# Patient Record
Sex: FEMALE | Race: WHITE | Employment: UNEMPLOYED | URBAN - METROPOLITAN AREA
[De-identification: names, ages, dates, MRNs, and addresses within clinical notes are randomized per-mention and may not be internally consistent; named-entity substitution may affect disease eponyms.]

---

## 2018-05-10 ENCOUNTER — OFFICE VISIT (OUTPATIENT)
Dept: PODIATRY | Age: 78
End: 2018-05-10
Payer: MEDICARE

## 2018-05-10 VITALS
HEIGHT: 62 IN | HEART RATE: 68 BPM | BODY MASS INDEX: 42.33 KG/M2 | SYSTOLIC BLOOD PRESSURE: 140 MMHG | DIASTOLIC BLOOD PRESSURE: 63 MMHG | WEIGHT: 230 LBS

## 2018-05-10 DIAGNOSIS — E11.42 TYPE 2 DIABETES MELLITUS WITH DIABETIC POLYNEUROPATHY, WITHOUT LONG-TERM CURRENT USE OF INSULIN (HCC): ICD-10-CM

## 2018-05-10 DIAGNOSIS — B35.1 ONYCHOMYCOSIS: Primary | ICD-10-CM

## 2018-05-10 PROCEDURE — 4040F PNEUMOC VAC/ADMIN/RCVD: CPT | Performed by: PODIATRIST

## 2018-05-10 PROCEDURE — 11721 DEBRIDE NAIL 6 OR MORE: CPT | Performed by: PODIATRIST

## 2018-05-10 PROCEDURE — G8417 CALC BMI ABV UP PARAM F/U: HCPCS | Performed by: PODIATRIST

## 2018-05-10 PROCEDURE — 1123F ACP DISCUSS/DSCN MKR DOCD: CPT | Performed by: PODIATRIST

## 2018-05-10 PROCEDURE — G8427 DOCREV CUR MEDS BY ELIG CLIN: HCPCS | Performed by: PODIATRIST

## 2018-05-10 PROCEDURE — 1090F PRES/ABSN URINE INCON ASSESS: CPT | Performed by: PODIATRIST

## 2018-05-10 PROCEDURE — 1036F TOBACCO NON-USER: CPT | Performed by: PODIATRIST

## 2018-05-10 PROCEDURE — 99203 OFFICE O/P NEW LOW 30 MIN: CPT | Performed by: PODIATRIST

## 2018-05-10 PROCEDURE — G8400 PT W/DXA NO RESULTS DOC: HCPCS | Performed by: PODIATRIST

## 2018-05-10 RX ORDER — SITAGLIPTIN AND METFORMIN HYDROCHLORIDE 500; 50 MG/1; MG/1
TABLET, FILM COATED ORAL
COMMUNITY
Start: 2018-03-07 | End: 2018-11-29

## 2018-05-10 RX ORDER — APIXABAN 5 MG/1
TABLET, FILM COATED ORAL
COMMUNITY
Start: 2018-03-07 | End: 2018-11-29

## 2018-05-10 RX ORDER — ATORVASTATIN CALCIUM 20 MG/1
20 TABLET, FILM COATED ORAL DAILY
COMMUNITY
Start: 2018-03-07

## 2018-05-10 RX ORDER — MONTELUKAST SODIUM 10 MG/1
10 TABLET ORAL NIGHTLY
COMMUNITY
Start: 2018-03-07

## 2018-05-10 RX ORDER — RIBOFLAVIN (VITAMIN B2) 100 MG
TABLET ORAL
COMMUNITY
Start: 2018-03-16 | End: 2019-08-24

## 2018-05-10 RX ORDER — ONDANSETRON 4 MG/1
4 TABLET, ORALLY DISINTEGRATING ORAL EVERY 6 HOURS PRN
COMMUNITY
Start: 2018-03-07

## 2018-05-10 RX ORDER — PREDNISONE 1 MG/1
5 TABLET ORAL DAILY
COMMUNITY
Start: 2018-03-07

## 2018-05-10 RX ORDER — LOSARTAN POTASSIUM 50 MG/1
TABLET ORAL
COMMUNITY
Start: 2018-03-07 | End: 2018-11-29

## 2018-05-10 RX ORDER — SPIRONOLACTONE 25 MG/1
12.5 TABLET ORAL DAILY
COMMUNITY
Start: 2018-03-07

## 2018-05-10 RX ORDER — POTASSIUM CHLORIDE 600 MG/1
10 TABLET, FILM COATED, EXTENDED RELEASE ORAL DAILY
COMMUNITY
Start: 2018-03-07

## 2018-05-10 RX ORDER — ATENOLOL 25 MG/1
25 TABLET ORAL 2 TIMES DAILY
COMMUNITY
Start: 2018-03-07

## 2018-05-10 RX ORDER — GLIPIZIDE 5 MG/1
5 TABLET, FILM COATED, EXTENDED RELEASE ORAL DAILY
COMMUNITY
Start: 2018-03-07

## 2018-05-10 ASSESSMENT — ENCOUNTER SYMPTOMS
NAUSEA: 0
DIARRHEA: 0
COLOR CHANGE: 0
VOMITING: 0
CONSTIPATION: 0

## 2018-08-16 ENCOUNTER — OFFICE VISIT (OUTPATIENT)
Dept: PODIATRY | Age: 78
End: 2018-08-16
Payer: MEDICARE

## 2018-08-16 VITALS
DIASTOLIC BLOOD PRESSURE: 81 MMHG | SYSTOLIC BLOOD PRESSURE: 174 MMHG | WEIGHT: 230 LBS | BODY MASS INDEX: 42.33 KG/M2 | HEART RATE: 72 BPM | HEIGHT: 62 IN

## 2018-08-16 DIAGNOSIS — B35.1 ONYCHOMYCOSIS: Primary | ICD-10-CM

## 2018-08-16 DIAGNOSIS — E11.42 TYPE 2 DIABETES MELLITUS WITH DIABETIC POLYNEUROPATHY, WITHOUT LONG-TERM CURRENT USE OF INSULIN (HCC): ICD-10-CM

## 2018-08-16 PROCEDURE — 11721 DEBRIDE NAIL 6 OR MORE: CPT | Performed by: PODIATRIST

## 2018-08-16 PROCEDURE — 99999 PR OFFICE/OUTPT VISIT,PROCEDURE ONLY: CPT | Performed by: PODIATRIST

## 2018-08-16 ASSESSMENT — ENCOUNTER SYMPTOMS
COLOR CHANGE: 0
NAUSEA: 0
VOMITING: 0
CONSTIPATION: 0
DIARRHEA: 0

## 2018-08-16 NOTE — PROGRESS NOTES
[facility]  Return Patient    Chief Complaint   Patient presents with    Nail Problem     nail trim/ last seen Dr. Gilda Hamman 6/13/18    Other     PCP: Last visit 06/13/2018 Dr Gilda Hamman       Subjective: Coby Judge comes to clinic for Nail Problem (nail trim/ last seen Dr. Gilda Hamman 6/13/18) and Other (PCP: Last visit 06/13/2018 Dr Gilda Hamman)    she is a diabetic and states that she has had diabetes for over 10 years, has good control. Feet are numb. Pt currently has complaint of thickened, elongated nails that they cannot manage by themselves. Pt's primary care physician is Gilda Hamman, MD   Pt's last blood sugar was 117 . No results found for: LABA1C   Complains of numbness in the feet bilat. History reviewed. No pertinent past medical history. Allergies   Allergen Reactions    Adhesive Tape     Avelox [Moxifloxacin]     Ciprofloxacin Nausea Only    Sulfa Antibiotics Nausea And Vomiting     Current Outpatient Prescriptions on File Prior to Visit   Medication Sig Dispense Refill    Vitamins A & D (VITAMIN A & D) 69743-839 units TABS       VENTOLIN  (90 Base) MCG/ACT inhaler       ELIQUIS 5 MG TABS tablet       atenolol (TENORMIN) 25 MG tablet       atorvastatin (LIPITOR) 20 MG tablet       glipiZIDE (GLUCOTROL XL) 5 MG extended release tablet       losartan (COZAAR) 50 MG tablet       montelukast (SINGULAIR) 10 MG tablet       ondansetron (ZOFRAN-ODT) 4 MG disintegrating tablet       potassium chloride (KLOR-CON) 8 MEQ extended release tablet       predniSONE (DELTASONE) 5 MG tablet       JANUMET  MG per tablet       spironolactone (ALDACTONE) 25 MG tablet        No current facility-administered medications on file prior to visit. Review of Systems   Constitutional: Negative for chills, diaphoresis, fatigue, fever and unexpected weight change. Cardiovascular: Positive for leg swelling.    Gastrointestinal: Negative for constipation, current use of insulin ([facility]       Orders Placed This Encounter   Procedures    HM DIABETES FOOT EXAM    TX DEBRIDEMENT OF NAILS, 6 OR MORE           Q7   []Yes    []No                Q8   [x]Yes    []No                     Q9   []Yes    []No    Plan:   Pt was evaluated and examined. Patient was given personalized discharge instructions. Nails 1-10 were debrided sharply in length and thickness with a nipper and , without incident. Pt will follow up in 3 months or sooner if any problems arise. Diagnosis was discussed with the pt and all of their questions were answered in detail. Proper foot hygiene and care was discussed with the pt. Informed patient on proper diabetic foot care and importance of tight glycemic control. Patient to check feet daily and contact the office with any questions/problems/concerns. Other comorbidity noted and will be managed by PCP. Diabetic foot examination performed this visit. The exam included neurological sensory exam, a 10-g monofilament and pinprick sensation, vibration using a 128-Hz tuning fork, ankle reflexes, visual skin inspection, vascular exam including assessment of pedal pulses, orthopedic exam for deformities, and shoe inspection. Increased risk factors noted on the diabetic foot exam include decreased sensory exam and peripheral neuropathy. Shoegear inspected and found to be appropriate size and wear.     8/16/2018    Electronically signed by Carol Martinez DPM on 8/16/2018 at 3:03 PM

## 2018-11-29 ENCOUNTER — OFFICE VISIT (OUTPATIENT)
Dept: PODIATRY | Age: 78
End: 2018-11-29
Payer: MEDICARE

## 2018-11-29 VITALS — BODY MASS INDEX: 41.59 KG/M2 | HEIGHT: 62 IN | WEIGHT: 226 LBS

## 2018-11-29 DIAGNOSIS — L89.611 PRESSURE INJURY OF RIGHT HEEL, STAGE 1: ICD-10-CM

## 2018-11-29 DIAGNOSIS — S90.821A BLISTER OF RIGHT FOOT, INITIAL ENCOUNTER: ICD-10-CM

## 2018-11-29 DIAGNOSIS — E11.42 TYPE 2 DIABETES MELLITUS WITH DIABETIC POLYNEUROPATHY, WITHOUT LONG-TERM CURRENT USE OF INSULIN (HCC): Primary | ICD-10-CM

## 2018-11-29 PROCEDURE — 1036F TOBACCO NON-USER: CPT | Performed by: PODIATRIST

## 2018-11-29 PROCEDURE — 99213 OFFICE O/P EST LOW 20 MIN: CPT | Performed by: PODIATRIST

## 2018-11-29 PROCEDURE — 1123F ACP DISCUSS/DSCN MKR DOCD: CPT | Performed by: PODIATRIST

## 2018-11-29 PROCEDURE — 1101F PT FALLS ASSESS-DOCD LE1/YR: CPT | Performed by: PODIATRIST

## 2018-11-29 PROCEDURE — G8417 CALC BMI ABV UP PARAM F/U: HCPCS | Performed by: PODIATRIST

## 2018-11-29 PROCEDURE — 1090F PRES/ABSN URINE INCON ASSESS: CPT | Performed by: PODIATRIST

## 2018-11-29 PROCEDURE — 4040F PNEUMOC VAC/ADMIN/RCVD: CPT | Performed by: PODIATRIST

## 2018-11-29 PROCEDURE — G8400 PT W/DXA NO RESULTS DOC: HCPCS | Performed by: PODIATRIST

## 2018-11-29 PROCEDURE — G8484 FLU IMMUNIZE NO ADMIN: HCPCS | Performed by: PODIATRIST

## 2018-11-29 PROCEDURE — G8427 DOCREV CUR MEDS BY ELIG CLIN: HCPCS | Performed by: PODIATRIST

## 2018-11-29 RX ORDER — POTASSIUM CHLORIDE 20 MEQ/1
20 TABLET, EXTENDED RELEASE ORAL DAILY
Refills: 3 | COMMUNITY
Start: 2018-10-25 | End: 2019-08-24

## 2018-11-29 RX ORDER — LOSARTAN POTASSIUM 25 MG/1
TABLET ORAL
Refills: 1 | COMMUNITY
Start: 2018-10-10 | End: 2019-08-24

## 2018-11-29 RX ORDER — PANTOPRAZOLE SODIUM 40 MG/1
40 TABLET, DELAYED RELEASE ORAL DAILY
Refills: 1 | COMMUNITY
Start: 2018-11-16

## 2018-11-29 RX ORDER — BUMETANIDE 1 MG/1
1 TABLET ORAL DAILY
Refills: 1 | COMMUNITY
Start: 2018-10-10

## 2018-11-29 RX ORDER — SITAGLIPTIN 50 MG/1
TABLET, FILM COATED ORAL
Refills: 1 | COMMUNITY
Start: 2018-10-12 | End: 2019-08-24

## 2018-11-29 RX ORDER — WARFARIN SODIUM 4 MG/1
TABLET ORAL DAILY
Refills: 1 | COMMUNITY
Start: 2018-10-10

## 2018-11-29 ASSESSMENT — ENCOUNTER SYMPTOMS
NAUSEA: 0
DIARRHEA: 0
VOMITING: 0
COLOR CHANGE: 0
CONSTIPATION: 0

## 2018-11-29 NOTE — PROGRESS NOTES
[facility]  Return Patient    Chief Complaint   Patient presents with    Follow-Up from [facility] Diabetes     BLOODSUGAR: [address] TO CHECK       Subjective: Kay Walker comes to clinic for Follow-Up from Hospital (RIGHT FOOT) and Diabetes (BLOODSUGAR: [address])    she is a diabetic and states that she has had diabetes for over 10 years, has good control. Feet are numb. She was in the hospital, but when she went home she felt that her right heel was rubbing on the bed, had pain. Went to ER, said she had a blister, gave her keflex. Has one more pill. No pain now, wearing a clean sock, no dressing. Pt's primary care physician is Yesenia Oden MD        No results found for: LABA1C   Complains of numbness in the feet bilat. History reviewed. No pertinent past medical history. Allergies   Allergen Reactions    Adhesive Tape     Avelox [Moxifloxacin]     Ciprofloxacin Nausea Only    Sulfa Antibiotics Nausea And Vomiting     Current Outpatient Prescriptions on File Prior to Visit   Medication Sig Dispense Refill    Vitamins A & D (VITAMIN A & D) 23483-614 units TABS       VENTOLIN  (90 Base) MCG/ACT inhaler       atenolol (TENORMIN) 25 MG tablet       atorvastatin (LIPITOR) 20 MG tablet       glipiZIDE (GLUCOTROL XL) 5 MG extended release tablet       montelukast (SINGULAIR) 10 MG tablet       ondansetron (ZOFRAN-ODT) 4 MG disintegrating tablet       potassium chloride (KLOR-CON) 8 MEQ extended release tablet       predniSONE (DELTASONE) 5 MG tablet       spironolactone (ALDACTONE) 25 MG tablet        No current facility-administered medications on file prior to visit. Review of Systems   Constitutional: Negative for chills, diaphoresis, fatigue, fever and unexpected weight change. Cardiovascular: Positive for leg swelling. Gastrointestinal: Negative for constipation, diarrhea, nausea and vomiting.    Musculoskeletal: Positive

## 2019-01-01 ENCOUNTER — OFFICE VISIT (OUTPATIENT)
Dept: PODIATRY | Age: 79
End: 2019-01-01
Payer: MEDICARE

## 2019-01-01 VITALS — WEIGHT: 174 LBS | BODY MASS INDEX: 30.82 KG/M2

## 2019-01-01 DIAGNOSIS — M21.961 FOOT DEFORMITY, BILATERAL: ICD-10-CM

## 2019-01-01 DIAGNOSIS — E11.42 TYPE 2 DIABETES MELLITUS WITH DIABETIC POLYNEUROPATHY, WITHOUT LONG-TERM CURRENT USE OF INSULIN (HCC): ICD-10-CM

## 2019-01-01 DIAGNOSIS — B35.1 ONYCHOMYCOSIS OF TOENAIL: Primary | ICD-10-CM

## 2019-01-01 DIAGNOSIS — M21.962 FOOT DEFORMITY, BILATERAL: ICD-10-CM

## 2019-01-01 PROCEDURE — 99999 PR OFFICE/OUTPT VISIT,PROCEDURE ONLY: CPT | Performed by: PODIATRIST

## 2019-01-01 PROCEDURE — 11721 DEBRIDE NAIL 6 OR MORE: CPT | Performed by: PODIATRIST

## 2019-01-07 ENCOUNTER — OFFICE VISIT (OUTPATIENT)
Dept: PODIATRY | Age: 79
End: 2019-01-07
Payer: MEDICARE

## 2019-01-07 VITALS — WEIGHT: 226 LBS | BODY MASS INDEX: 41.59 KG/M2 | HEIGHT: 62 IN

## 2019-01-07 DIAGNOSIS — M79.675 PAIN OF TOES OF BOTH FEET: ICD-10-CM

## 2019-01-07 DIAGNOSIS — E11.42 TYPE 2 DIABETES MELLITUS WITH DIABETIC POLYNEUROPATHY, WITHOUT LONG-TERM CURRENT USE OF INSULIN (HCC): ICD-10-CM

## 2019-01-07 DIAGNOSIS — B35.1 ONYCHOMYCOSIS OF TOENAIL: Primary | ICD-10-CM

## 2019-01-07 DIAGNOSIS — M79.674 PAIN OF TOES OF BOTH FEET: ICD-10-CM

## 2019-01-07 PROCEDURE — 11721 DEBRIDE NAIL 6 OR MORE: CPT | Performed by: PODIATRIST

## 2019-01-07 PROCEDURE — 99999 PR OFFICE/OUTPT VISIT,PROCEDURE ONLY: CPT | Performed by: PODIATRIST

## 2019-01-07 ASSESSMENT — ENCOUNTER SYMPTOMS
BACK PAIN: 0
COLOR CHANGE: 0
NAUSEA: 0
DIARRHEA: 0
SHORTNESS OF BREATH: 0

## 2019-02-21 ENCOUNTER — HOSPITAL ENCOUNTER (OUTPATIENT)
Dept: PULMONOLOGY | Age: 79
Discharge: HOME | End: 2019-02-21
Payer: MEDICARE

## 2019-02-21 PROCEDURE — 94727 GAS DIL/WSHOT DETER LNG VOL: CPT

## 2019-02-21 PROCEDURE — 6360000002 HC RX W HCPCS: Performed by: INTERNAL MEDICINE

## 2019-02-21 PROCEDURE — 94728 AIRWY RESIST BY OSCILLOMETRY: CPT

## 2019-02-21 PROCEDURE — 94729 DIFFUSING CAPACITY: CPT

## 2019-02-21 PROCEDURE — 94664 DEMO&/EVAL PT USE INHALER: CPT

## 2019-02-21 PROCEDURE — 94060 EVALUATION OF WHEEZING: CPT

## 2019-02-21 PROCEDURE — 94726 PLETHYSMOGRAPHY LUNG VOLUMES: CPT

## 2019-02-21 RX ORDER — ALBUTEROL SULFATE 2.5 MG/3ML
2.5 SOLUTION RESPIRATORY (INHALATION) ONCE
Status: COMPLETED | OUTPATIENT
Start: 2019-02-21 | End: 2019-02-21

## 2019-02-21 RX ADMIN — ALBUTEROL SULFATE 2.5 MG: 2.5 SOLUTION RESPIRATORY (INHALATION) at 12:27

## 2019-03-14 LAB
ABO INTERPRETATION: NORMAL
ANTIBODY SCREEN INTERPRETATION: NORMAL
BUN BLDV-MCNC: 25 MG/DL (ref 7–25)
CALCIUM SERPL-MCNC: 9.8 MG/DL (ref 8.6–10.3)
CHLORIDE BLD-SCNC: 102 MEQ/L (ref 98–107)
CO2: 30 MEQ/L (ref 21–31)
CREAT SERPL-MCNC: 1.11 MG/DL (ref 0.6–1.2)
EGFR AFRICAN AMERICAN: 58 ML/MIN/1.73SQ M
EGFR IF NONAFRICAN AMERICAN: 47 ML/MIN/1.73SQ M
GLUCOSE: 143 MG/DL (ref 70–100)
HCT VFR BLD CALC: 35.9 % (ref 36–45)
HEMOGLOBIN: 10.9 G/DL (ref 12–15)
INR BLD: 2.34 (ref 0.91–1.16)
MCH RBC QN AUTO: 28.7 PG (ref 27–33)
MCHC RBC AUTO-ENTMCNC: 30.4 G/DL (ref 32–35)
MCV RBC AUTO: 94.5 FL (ref 82–98)
NUCLEATED RED BLOOD CELLS: 0 % (ref 0–0)
PDW BLD-RTO: 16.8 % (ref 11.5–15)
PLATELET # BLD: 254 10*3/UL (ref 150–400)
POTASSIUM SERPL-SCNC: 4.2 MEQ/L (ref 3.5–5.1)
PT: 25.8 SEC (ref 12.3–14.8)
RBC: 3.8 10*6/UL (ref 3.8–5)
RH INTERPRETATION: NORMAL
SODIUM BLD-SCNC: 140 MEQ/L (ref 136–145)
WBC: 13.77 10*3/UL (ref 4–10.6)

## 2019-03-29 LAB
BASE EXCESS: -1 MMOL/L (ref -2–3)
BASE EXCESS: -2 MMOL/L (ref -2–3)
BASE EXCESS: -3 MMOL/L (ref -2–3)
BASE EXCESS: 0 MMOL/L (ref -2–3)
BASE EXCESS: 1 MMOL/L (ref -2–3)
BASE EXCESS: 2 MMOL/L (ref -2–3)
CALCIUM IONIZED: 1.24 MMOL/L (ref 1.13–1.32)
CARBOXYHEMOGLOBIN: 1.5 % (ref 0–1.5)
GLUCOSE BLD-MCNC: 134 MG/DL (ref 70–100)
GLUCOSE BLD-MCNC: 139 MG/DL (ref 70–100)
GLUCOSE BLD-MCNC: 139 MG/DL (ref 70–100)
GLUCOSE BLD-MCNC: 164 MG/DL (ref 70–100)
GLUCOSE BLD-MCNC: 164 MG/DL (ref 70–100)
GLUCOSE BLD-MCNC: 168 MG/DL (ref 70–100)
GLUCOSE: 189 MG/DL (ref 70–105)
GLUCOSE: 208 MG/DL (ref 70–105)
GLUCOSE: 252 MG/DL (ref 70–105)
GLUCOSE: 268 MG/DL (ref 70–105)
HCO3: 24 MMOL/L (ref 21–28)
HCO3: 25 MMOL/L (ref 21–28)
HCT VFR BLD CALC: 28 % (ref 38–51)
HCT VFR BLD CALC: 28 % (ref 38–51)
HCT VFR BLD CALC: 30 % (ref 38–51)
HCT VFR BLD CALC: 30 % (ref 38–51)
HEMOGLOBIN: 10.2 G/DL (ref 12–17)
HEMOGLOBIN: 10.2 G/DL (ref 12–17)
HEMOGLOBIN: 9.5 G/DL (ref 12–17)
HEMOGLOBIN: 9.5 G/DL (ref 12–17)
IONIZED CA: 1.2 MMOL/L (ref 1.12–1.32)
IONIZED CA: 1.21 MMOL/L (ref 1.12–1.32)
IONIZED CA: 1.24 MMOL/L (ref 1.12–1.32)
IONIZED CA: 1.24 MMOL/L (ref 1.12–1.32)
METHEMOGLOBIN: 1 % (ref 0–1.5)
OXYHEMOGLOBIN: 96.1 % (ref 94–97)
OXYHEMOGLOBIN: 97 % (ref 94–97)
PCO2: 38 MMHG (ref 35–45)
PCO2: 39.1 MMHG (ref 35–45)
PCO2: 44.3 MMHG (ref 35–45)
PCO2: 44.4 MMHG (ref 35–45)
PCO2: 44.7 MMHG (ref 35–45)
PCO2: 45 MMHG (ref 35–45)
PH: 7.32 (ref 7.35–7.45)
PH: 7.33 PH (ref 7.35–7.45)
PH: 7.36 (ref 7.35–7.45)
PH: 7.39 (ref 7.35–7.45)
PH: 7.42 PH (ref 7.35–7.45)
PH: 7.43 (ref 7.35–7.45)
PO2: 152 MMHG (ref 80–105)
PO2: 167 MMHG (ref 80–105)
PO2: 175 MMHG (ref 80–105)
PO2: 185 MMHG (ref 83–108)
PO2: 192 MMHG (ref 80–105)
PO2: 218 MMHG (ref 83–108)
POTASSIUM SERPL-SCNC: 4 MMOL/L (ref 3.5–4.9)
POTASSIUM SERPL-SCNC: 4.1 MMOL/L (ref 3.5–4.9)
POTASSIUM SERPL-SCNC: 4.2 MMOL/L (ref 3.5–4.9)
POTASSIUM SERPL-SCNC: 4.3 MMOL/L (ref 3.5–4.9)
SODIUM BLD-SCNC: 138 MMOL/L (ref 138–146)
SODIUM BLD-SCNC: 139 MMOL/L (ref 138–146)
SODIUM BLD-SCNC: 139 MMOL/L (ref 138–146)
SODIUM BLD-SCNC: 141 MMOL/L (ref 138–146)
TOTAL HGB: 8.8 G/DL (ref 12–16.3)

## 2019-05-11 ENCOUNTER — HOSPITAL ENCOUNTER (OUTPATIENT)
Age: 79
Discharge: HOME | End: 2019-05-11
Payer: MEDICARE

## 2019-05-11 LAB
ANION GAP SERPL CALCULATED.3IONS-SCNC: 12 MMOL/L (ref 9–17)
BUN BLDV-MCNC: 15 MG/DL (ref 8–23)
BUN/CREAT BLD: ABNORMAL (ref 9–20)
CALCIUM SERPL-MCNC: 8.7 MG/DL (ref 8.6–10.4)
CHLORIDE BLD-SCNC: 99 MMOL/L (ref 98–107)
CO2: 28 MMOL/L (ref 20–31)
CREAT SERPL-MCNC: 0.87 MG/DL (ref 0.5–0.9)
GFR AFRICAN AMERICAN: >60 ML/MIN
GFR NON-AFRICAN AMERICAN: >60 ML/MIN
GFR SERPL CREATININE-BSD FRML MDRD: ABNORMAL ML/MIN/{1.73_M2}
GFR SERPL CREATININE-BSD FRML MDRD: ABNORMAL ML/MIN/{1.73_M2}
GLUCOSE BLD-MCNC: 164 MG/DL (ref 70–99)
POTASSIUM SERPL-SCNC: 3.8 MMOL/L (ref 3.7–5.3)
SODIUM BLD-SCNC: 139 MMOL/L (ref 135–144)

## 2019-05-11 PROCEDURE — 36415 COLL VENOUS BLD VENIPUNCTURE: CPT

## 2019-05-11 PROCEDURE — 80048 BASIC METABOLIC PNL TOTAL CA: CPT

## 2019-08-24 ENCOUNTER — APPOINTMENT (OUTPATIENT)
Dept: GENERAL RADIOLOGY | Age: 79
End: 2019-08-24
Payer: MEDICARE

## 2019-08-24 ENCOUNTER — HOSPITAL ENCOUNTER (EMERGENCY)
Age: 79
Discharge: HOME | End: 2019-08-24
Attending: EMERGENCY MEDICINE
Payer: MEDICARE

## 2019-08-24 VITALS
BODY MASS INDEX: 33.13 KG/M2 | HEIGHT: 63 IN | RESPIRATION RATE: 16 BRPM | HEART RATE: 60 BPM | DIASTOLIC BLOOD PRESSURE: 57 MMHG | OXYGEN SATURATION: 99 % | TEMPERATURE: 98.7 F | WEIGHT: 187 LBS | SYSTOLIC BLOOD PRESSURE: 141 MMHG

## 2019-08-24 DIAGNOSIS — M79.604 RIGHT LEG PAIN: ICD-10-CM

## 2019-08-24 DIAGNOSIS — N39.0 URINARY TRACT INFECTION WITHOUT HEMATURIA, SITE UNSPECIFIED: Primary | ICD-10-CM

## 2019-08-24 LAB
-: ABNORMAL
-: NORMAL
ABSOLUTE EOS #: 0.1 K/UL (ref 0–0.4)
ABSOLUTE IMMATURE GRANULOCYTE: ABNORMAL K/UL (ref 0–0.3)
ABSOLUTE LYMPH #: 0.9 K/UL (ref 1–4.8)
ABSOLUTE MONO #: 0.5 K/UL (ref 0.1–1.3)
AMORPHOUS: ABNORMAL
ANION GAP SERPL CALCULATED.3IONS-SCNC: 13 MMOL/L (ref 9–17)
BACTERIA: ABNORMAL
BASOPHILS # BLD: 1 % (ref 0–2)
BASOPHILS ABSOLUTE: 0 K/UL (ref 0–0.2)
BILIRUBIN URINE: NEGATIVE
BUN BLDV-MCNC: 20 MG/DL (ref 8–23)
BUN/CREAT BLD: ABNORMAL (ref 9–20)
CALCIUM SERPL-MCNC: 9.5 MG/DL (ref 8.6–10.4)
CASTS UA: ABNORMAL /LPF
CHLORIDE BLD-SCNC: 102 MMOL/L (ref 98–107)
CO2: 27 MMOL/L (ref 20–31)
COLOR: YELLOW
COMMENT UA: ABNORMAL
CREAT SERPL-MCNC: 1.36 MG/DL (ref 0.5–0.9)
CRYSTALS, UA: ABNORMAL /HPF
DIFFERENTIAL TYPE: ABNORMAL
EOSINOPHILS RELATIVE PERCENT: 2 % (ref 0–4)
EPITHELIAL CELLS UA: ABNORMAL /HPF
GFR AFRICAN AMERICAN: 46 ML/MIN
GFR NON-AFRICAN AMERICAN: 38 ML/MIN
GFR SERPL CREATININE-BSD FRML MDRD: ABNORMAL ML/MIN/{1.73_M2}
GFR SERPL CREATININE-BSD FRML MDRD: ABNORMAL ML/MIN/{1.73_M2}
GLUCOSE BLD-MCNC: 148 MG/DL (ref 70–99)
GLUCOSE URINE: NEGATIVE
HCT VFR BLD CALC: 33.1 % (ref 36–46)
HEMOGLOBIN: 10.5 G/DL (ref 12–16)
IMMATURE GRANULOCYTES: ABNORMAL %
INR BLD: 1.8
KETONES, URINE: NEGATIVE
LEUKOCYTE ESTERASE, URINE: ABNORMAL
LYMPHOCYTES # BLD: 18 % (ref 24–44)
MCH RBC QN AUTO: 33 PG (ref 26–34)
MCHC RBC AUTO-ENTMCNC: 31.8 G/DL (ref 31–37)
MCV RBC AUTO: 103.8 FL (ref 80–100)
MONOCYTES # BLD: 10 % (ref 1–7)
MUCUS: ABNORMAL
NITRITE, URINE: NEGATIVE
NRBC AUTOMATED: ABNORMAL PER 100 WBC
OTHER OBSERVATIONS UA: ABNORMAL
PDW BLD-RTO: 16.9 % (ref 11.5–14.9)
PH UA: 8 (ref 5–8)
PLATELET # BLD: 101 K/UL (ref 150–450)
PLATELET ESTIMATE: ABNORMAL
PMV BLD AUTO: 9.6 FL (ref 6–12)
POTASSIUM SERPL-SCNC: 4 MMOL/L (ref 3.7–5.3)
PROTEIN UA: ABNORMAL
PROTHROMBIN TIME: 21.3 SEC (ref 11.8–14.6)
RBC # BLD: 3.19 M/UL (ref 4–5.2)
RBC # BLD: ABNORMAL 10*6/UL
RBC UA: ABNORMAL /HPF
REASON FOR REJECTION: NORMAL
RENAL EPITHELIAL, UA: ABNORMAL /HPF
SEG NEUTROPHILS: 69 % (ref 36–66)
SEGMENTED NEUTROPHILS ABSOLUTE COUNT: 3.4 K/UL (ref 1.3–9.1)
SODIUM BLD-SCNC: 142 MMOL/L (ref 135–144)
SPECIFIC GRAVITY UA: 1.01 (ref 1–1.03)
TRICHOMONAS: ABNORMAL
TURBIDITY: ABNORMAL
URINE HGB: ABNORMAL
UROBILINOGEN, URINE: NORMAL
WBC # BLD: 4.9 K/UL (ref 3.5–11)
WBC # BLD: ABNORMAL 10*3/UL
WBC UA: ABNORMAL /HPF
YEAST: ABNORMAL
ZZ NTE CLEAN UP: ORDERED TEST: NORMAL
ZZ NTE WITH NAME CLEAN UP: SPECIMEN SOURCE: NORMAL

## 2019-08-24 PROCEDURE — 36415 COLL VENOUS BLD VENIPUNCTURE: CPT

## 2019-08-24 PROCEDURE — 73552 X-RAY EXAM OF FEMUR 2/>: CPT

## 2019-08-24 PROCEDURE — 99284 EMERGENCY DEPT VISIT MOD MDM: CPT

## 2019-08-24 PROCEDURE — 80048 BASIC METABOLIC PNL TOTAL CA: CPT

## 2019-08-24 PROCEDURE — 85025 COMPLETE CBC W/AUTO DIFF WBC: CPT

## 2019-08-24 PROCEDURE — 93971 EXTREMITY STUDY: CPT

## 2019-08-24 PROCEDURE — 6370000000 HC RX 637 (ALT 250 FOR IP): Performed by: EMERGENCY MEDICINE

## 2019-08-24 PROCEDURE — 85610 PROTHROMBIN TIME: CPT

## 2019-08-24 PROCEDURE — 87086 URINE CULTURE/COLONY COUNT: CPT

## 2019-08-24 PROCEDURE — 81001 URINALYSIS AUTO W/SCOPE: CPT

## 2019-08-24 PROCEDURE — 86403 PARTICLE AGGLUT ANTBDY SCRN: CPT

## 2019-08-24 RX ORDER — CEPHALEXIN 250 MG/1
500 CAPSULE ORAL ONCE
Status: COMPLETED | OUTPATIENT
Start: 2019-08-24 | End: 2019-08-24

## 2019-08-24 RX ORDER — CEPHALEXIN 500 MG/1
500 CAPSULE ORAL 2 TIMES DAILY
Qty: 14 CAPSULE | Refills: 0 | Status: SHIPPED | OUTPATIENT
Start: 2019-08-24 | End: 2019-08-31

## 2019-08-24 RX ADMIN — CEPHALEXIN 500 MG: 250 CAPSULE ORAL at 12:31

## 2019-08-24 SDOH — HEALTH STABILITY: MENTAL HEALTH: HOW OFTEN DO YOU HAVE A DRINK CONTAINING ALCOHOL?: NEVER

## 2019-08-24 ASSESSMENT — ENCOUNTER SYMPTOMS
GASTROINTESTINAL NEGATIVE: 1
COUGH: 0
NAUSEA: 0
BACK PAIN: 0
ABDOMINAL PAIN: 0
RESPIRATORY NEGATIVE: 1
SHORTNESS OF BREATH: 0
EYES NEGATIVE: 1

## 2019-08-24 ASSESSMENT — PAIN SCALES - GENERAL: PAINLEVEL_OUTOF10: 7

## 2019-08-24 NOTE — ED PROVIDER NOTES
 DVT (deep venous thrombosis) (HCC)     Hypertension     Stomach cancer ([facility] [address]      SURGICAL HISTORY       Past Surgical History:   Procedure Laterality Date    CATARACT REMOVAL WITH IMPLANT Bilateral     CHOLECYSTECTOMY      HYSTERECTOMY      complete hysterectomy    KNEE ARTHROPLASTY Bilateral     STOMACH SURGERY  2019    2/3 of stomach removed d/t cancer     CURRENT MEDICATIONS       Previous Medications    ATENOLOL (TENORMIN) 25 MG TABLET    Take 25 mg by mouth 2 times daily     ATORVASTATIN (LIPITOR) 20 MG TABLET    Take 20 mg by mouth daily     BUMETANIDE (BUMEX) 1 MG TABLET    Take 1 mg by mouth daily     GLIPIZIDE (GLUCOTROL XL) 5 MG EXTENDED RELEASE TABLET    Take 5 mg by mouth daily     MONTELUKAST (SINGULAIR) 10 MG TABLET    Take 10 mg by mouth nightly     ONDANSETRON (ZOFRAN-ODT) 4 MG DISINTEGRATING TABLET    Place 4 mg under the tongue every 6 hours as needed for Nausea or Vomiting     PANTOPRAZOLE (PROTONIX) 40 MG TABLET    Take 40 mg by mouth daily     POTASSIUM CHLORIDE (KLOR-CON) 8 MEQ EXTENDED RELEASE TABLET    Take 10 mEq by mouth daily     PREDNISONE (DELTASONE) 5 MG TABLET    Take 5 mg by mouth daily     SPIRONOLACTONE (ALDACTONE) 25 MG TABLET    Take 12.5 mg by mouth daily     VENTOLIN  (90 BASE) MCG/ACT INHALER    Inhale 2 puffs into the lungs every 4 hours as needed     VITAMIN D (CHOLECALCIFEROL) 1000 UNIT TABS TABLET    Take 1,000 Units by mouth daily    WARFARIN (COUMADIN) 4 MG TABLET    Take by mouth daily M, Th 4mg, Cabrera,Tu, Wed, Fri, Sat 2mg     ALLERGIES     is allergic to latex; adhesive tape; irbesartan-hydrochlorothiazide; avelox [moxifloxacin]; ciprofloxacin; and sulfa antibiotics. FAMILY HISTORY     She indicated that her mother is . She indicated that her father is . She indicated that her brother is .      SOCIAL HISTORY       Social History     Tobacco Use    Smoking status: Never Smoker    Smokeless tobacco: Never Used   Substance

## 2019-08-25 LAB
CULTURE: ABNORMAL
Lab: ABNORMAL
SPECIMEN DESCRIPTION: ABNORMAL

## 2020-01-01 ENCOUNTER — HOSPITAL ENCOUNTER (EMERGENCY)
Age: 80
Discharge: ANOTHER HOSPITAL | End: 2020-01-23
Attending: EMERGENCY MEDICINE
Payer: MEDICARE

## 2020-01-01 VITALS
RESPIRATION RATE: 16 BRPM | OXYGEN SATURATION: 97 % | HEART RATE: 64 BPM | HEIGHT: 62 IN | BODY MASS INDEX: 32.2 KG/M2 | DIASTOLIC BLOOD PRESSURE: 32 MMHG | TEMPERATURE: 98.4 F | WEIGHT: 175 LBS | SYSTOLIC BLOOD PRESSURE: 131 MMHG

## 2020-01-01 LAB
ABSOLUTE EOS #: 0.1 K/UL (ref 0–0.4)
ABSOLUTE IMMATURE GRANULOCYTE: ABNORMAL K/UL (ref 0–0.3)
ABSOLUTE LYMPH #: 1 K/UL (ref 1–4.8)
ABSOLUTE MONO #: 0.7 K/UL (ref 0.1–1.3)
ANION GAP SERPL CALCULATED.3IONS-SCNC: 14 MMOL/L (ref 9–17)
BASOPHILS # BLD: 1 % (ref 0–2)
BASOPHILS ABSOLUTE: 0.1 K/UL (ref 0–0.2)
BUN BLDV-MCNC: 27 MG/DL (ref 8–23)
BUN/CREAT BLD: ABNORMAL (ref 9–20)
CALCIUM SERPL-MCNC: 9.8 MG/DL (ref 8.6–10.4)
CHLORIDE BLD-SCNC: 95 MMOL/L (ref 98–107)
CO2: 27 MMOL/L (ref 20–31)
CREAT SERPL-MCNC: 1.15 MG/DL (ref 0.5–0.9)
DIFFERENTIAL TYPE: ABNORMAL
EOSINOPHILS RELATIVE PERCENT: 1 % (ref 0–4)
GFR AFRICAN AMERICAN: 55 ML/MIN
GFR NON-AFRICAN AMERICAN: 46 ML/MIN
GFR SERPL CREATININE-BSD FRML MDRD: ABNORMAL ML/MIN/{1.73_M2}
GFR SERPL CREATININE-BSD FRML MDRD: ABNORMAL ML/MIN/{1.73_M2}
GLUCOSE BLD-MCNC: 113 MG/DL (ref 70–99)
HCT VFR BLD CALC: 34.2 % (ref 36–46)
HEMOGLOBIN: 11.2 G/DL (ref 12–16)
IMMATURE GRANULOCYTES: ABNORMAL %
LYMPHOCYTES # BLD: 13 % (ref 24–44)
MCH RBC QN AUTO: 30.5 PG (ref 26–34)
MCHC RBC AUTO-ENTMCNC: 32.7 G/DL (ref 31–37)
MCV RBC AUTO: 93.3 FL (ref 80–100)
MONOCYTES # BLD: 10 % (ref 1–7)
MYOGLOBIN: 46 NG/ML (ref 25–58)
NRBC AUTOMATED: ABNORMAL PER 100 WBC
PDW BLD-RTO: 15.6 % (ref 11.5–14.9)
PLATELET # BLD: 154 K/UL (ref 150–450)
PLATELET ESTIMATE: ABNORMAL
PMV BLD AUTO: 8.9 FL (ref 6–12)
POTASSIUM SERPL-SCNC: 3.9 MMOL/L (ref 3.7–5.3)
RBC # BLD: 3.66 M/UL (ref 4–5.2)
RBC # BLD: ABNORMAL 10*6/UL
SEG NEUTROPHILS: 75 % (ref 36–66)
SEGMENTED NEUTROPHILS ABSOLUTE COUNT: 5.5 K/UL (ref 1.3–9.1)
SODIUM BLD-SCNC: 136 MMOL/L (ref 135–144)
TOTAL CK: 56 U/L (ref 26–192)
WBC # BLD: 7.4 K/UL (ref 3.5–11)
WBC # BLD: ABNORMAL 10*3/UL

## 2020-01-01 PROCEDURE — 99284 EMERGENCY DEPT VISIT MOD MDM: CPT

## 2020-01-01 PROCEDURE — 80048 BASIC METABOLIC PNL TOTAL CA: CPT

## 2020-01-01 PROCEDURE — 36415 COLL VENOUS BLD VENIPUNCTURE: CPT

## 2020-01-01 PROCEDURE — 96374 THER/PROPH/DIAG INJ IV PUSH: CPT

## 2020-01-01 PROCEDURE — 82550 ASSAY OF CK (CPK): CPT

## 2020-01-01 PROCEDURE — 6360000002 HC RX W HCPCS: Performed by: EMERGENCY MEDICINE

## 2020-01-01 PROCEDURE — 83874 ASSAY OF MYOGLOBIN: CPT

## 2020-01-01 PROCEDURE — 85025 COMPLETE CBC W/AUTO DIFF WBC: CPT

## 2020-01-01 RX ORDER — KETOROLAC TROMETHAMINE 30 MG/ML
15 INJECTION, SOLUTION INTRAMUSCULAR; INTRAVENOUS ONCE
Status: COMPLETED | OUTPATIENT
Start: 2020-01-01 | End: 2020-01-01

## 2020-01-01 RX ADMIN — KETOROLAC TROMETHAMINE 15 MG: 30 INJECTION, SOLUTION INTRAMUSCULAR at 17:55

## 2020-01-01 ASSESSMENT — ENCOUNTER SYMPTOMS
BLOOD IN STOOL: 0
WHEEZING: 0
NAUSEA: 0
DIARRHEA: 0
SORE THROAT: 0
EYE REDNESS: 0
VOMITING: 0
FACIAL SWELLING: 0
EYE DISCHARGE: 0
CHEST TIGHTNESS: 0
BACK PAIN: 0
TROUBLE SWALLOWING: 0
EYE PAIN: 0
RHINORRHEA: 0
ABDOMINAL PAIN: 0
COLOR CHANGE: 0
SINUS PRESSURE: 0
CONSTIPATION: 0
COUGH: 0
SHORTNESS OF BREATH: 0

## 2020-01-01 ASSESSMENT — PAIN SCALES - GENERAL
PAINLEVEL_OUTOF10: 9
PAINLEVEL_OUTOF10: 5
PAINLEVEL_OUTOF10: 10

## 2020-01-23 NOTE — ED PROVIDER NOTES
[address]  eMERGENCY dEPARTMENT eNCOUnter      Pt Name: Beny Boss  MRN: [id number]  [place] 1940  Date of evaluation: 1/23/20      CHIEF COMPLAINT       Chief Complaint   Patient presents with    Leg Pain     Rt upper leg pain x 2 months    Arm Pain     Rt upper arm pain x 1 month         HISTORY OF PRESENT ILLNESS    eBny Boss is a 78 y.o. female who presents complaining of leg pain. Patient has been having pain in her right thigh for the last 2 months. Patient is being worked up by the orthopedic doctor and by the PCP. Patient has history of cancer in her stomach and they think she might have cancer in her right hip. Patient had x-rays done that showed no fractures or new abnormalities and was being treated with Ultram for pain. Patient states that the pain is just so severe that she really cannot get up and down her steps anymore. Patient denies any injuries. Patient has no numbness tingling or weakness. REVIEW OF SYSTEMS       Review of Systems   Constitutional: Negative for activity change, appetite change, chills, diaphoresis and fever. HENT: Negative for congestion, ear pain, facial swelling, nosebleeds, rhinorrhea, sinus pressure, sore throat and trouble swallowing. Eyes: Negative for pain, discharge and redness. Respiratory: Negative for cough, chest tightness, shortness of breath and wheezing. Cardiovascular: Negative for chest pain, palpitations and leg swelling. Gastrointestinal: Negative for abdominal pain, blood in stool, constipation, diarrhea, nausea and vomiting. Genitourinary: Negative for difficulty urinating, dysuria, flank pain, frequency, genital sores and hematuria. Musculoskeletal: Negative for arthralgias, back pain, gait problem, joint swelling, myalgias and neck pain. Leg pain and right arm pain without injury. Skin: Negative for color change, pallor, rash and wound.    Neurological: Negative for dizziness, tremors, seizures, syncope, speech difficulty, weakness, numbness and headaches. Psychiatric/Behavioral: Negative for confusion, decreased concentration, hallucinations, self-injury, sleep disturbance and suicidal ideas.        PAST MEDICAL HISTORY     Past Medical History:   Diagnosis Date    [facility]     Cancer [facility]     [facility]     Rt kidney cancer    CHF (congestive heart failure) (HCC)     Diabetes mellitus ([facility]     D[facility]) [facility]     Hypertension     Stom[facility]        SURGICAL HISTORY       Past Surgical History:   Procedure Laterality Date    CATARACT REMOVAL WITH IMPLANT Bilateral     CHOLECYSTECTOMY      HYSTERECTOMY      complete hysterectomy    KNEE ARTHROPLASTY Bilateral     STOMACH SURGERY  03/29/2019    2/3 of stomach removed d/t cancer       CURRENT MEDICATIONS       Previous Medications    ATENOLOL (TENORMIN) 25 MG TABLET    Take 25 mg by mouth 2 times daily     ATORVASTATIN (LIPITOR) 20 MG TABLET    Take 20 mg by mouth daily     BUMETANIDE (BUMEX) 1 MG TABLET    Take 1 mg by mouth daily     GLIPIZIDE (GLUCOTROL XL) 5 MG EXTENDED RELEASE TABLET    Take 5 mg by mouth daily     MONTELUKAST (SINGULAIR) 10 MG TABLET    Take 10 mg by mouth nightly     ONDANSETRON (ZOFRAN-ODT) 4 MG DISINTEGRATING TABLET    Place 4 mg under the tongue every 6 hours as needed for Nausea or Vomiting     PANTOPRAZOLE (PROTONIX) 40 MG TABLET    Take 40 mg by mouth daily     POTASSIUM CHLORIDE (KLOR-CON) 8 MEQ EXTENDED RELEASE TABLET    Take 10 mEq by mouth daily     PREDNISONE (DELTASONE) 5 MG TABLET    Take 5 mg by mouth daily     SPIRONOLACTONE (ALDACTONE) 25 MG TABLET    Take 12.5 mg by mouth daily     VENTOLIN  (90 BASE) MCG/ACT INHALER    Inhale 2 puffs into the lungs every 4 hours as needed     VITAMIN D (CHOLECALCIFEROL) 1000 UNIT TABS TABLET    Take 1,000 Units by mouth daily    WARFARIN (COUMADIN) 4 MG TABLET    Take by mouth daily M, Th 4mg, Nerves: No cranial nerve deficit. Sensory: No sensory deficit. Motor: No abnormal muscle tone. Coordination: Coordination normal.      Deep Tendon Reflexes: Reflexes normal.   Psychiatric:         Behavior: Behavior normal.         Thought Content: Thought content normal.         Judgment: Judgment normal.         DIAGNOSTIC RESULTS     RADIOLOGY:All plain film, CT,MRI, and formal ultrasound images (except ED bedside ultrasound) are read by the radiologist and the interpretations are directly viewed by the emergency physician. No results found. LABS: All lab results were reviewed by myself, and all abnormals are listed below. Labs Reviewed   CBC WITH AUTO DIFFERENTIAL - Abnormal; Notable for the following components:       Result Value    RBC 3.66 (*)     Hemoglobin 11.2 (*)     Hematocrit 34.2 (*)     RDW 15.6 (*)     Seg Neutrophils 75 (*)     Lymphocytes 13 (*)     Monocytes 10 (*)     All other components within normal limits   BASIC METABOLIC PANEL - Abnormal; Notable for the following components:    Glucose 113 (*)     BUN 27 (*)     CREATININE 1.15 (*)     Chloride 95 (*)     GFR Non- 46 (*)     GFR  55 (*)     All other components within normal limits   MYOGLOBIN, SERUM   CK         MEDICAL DECISION MAKING:     Patient is having chronic pain issues that is already been worked up by her family doctor and orthopedic surgeon and at this time they are being treated with Ultram.  Sounds like the patient is just having poor pain control and having mobility issues at home. We will have to continue this work-up as an outpatient but I will do some basic labs to make sure that this is not rhabdomyolysis.       EMERGENCY DEPARTMENT COURSE:   Vitals:    Vitals:    01/23/20 1725 01/23/20 1758 01/23/20 1833   BP: (!) 151/57 (!) 147/52 (!) 133/52   Pulse: 65 62 64   Resp: 16 16 16   Temp: 98.4 °F (36.9 °C)     TempSrc: Oral     SpO2: 98% 96% 97%   Weight: 175 lb (79.4 kg)     Height: 5' 2\" (1.575 m)         The patient was given the following medications while in the emergency department:  Orders Placed This Encounter   Medications    ketorolac (TORADOL) injection 15 mg       -------------------------  8:02 PM  Patient was reevaluated and is feeling better but family still states that there is no way they would be able to take her home because she is not been able to get out of the house to go to her doctor's appointments and get the pain worked up as an outpatient. They also have stairs at the house and she is at risk of falling if she does try to get up. Patient and family are requesting to go to [facility] which is where all other specialists are that of been working this up so far. I spoke with Dr. Kemal Mckeon who is the hospitalist over there who agrees to accept the transfer. CONSULTS:  None    PROCEDURES:  None    FINAL IMPRESSION      1. Pain of right lower extremity          DISPOSITION/PLAN   DISPOSITION Decision To Transfer 01/23/2020 08:02:03 PM      PATIENT REFERREDTO:  No follow-up provider specified.     DISCHARGEMEDICATIONS:  New Prescriptions    No medications on file       (Please note that portions of this note were completed with a voice recognition program.  Efforts were made to edit thedictations but occasionally words are mis-transcribed.)    Khadijah Richards MD  Attending Emergency Physician                        Khadijah Richards MD  01/23/20 2003

## 2020-01-24 NOTE — ED NOTES
Pt discharged in stable condition to [id number] [place] [address] for transport to [facility], RN  01/23/20 [id number]

## 2020-01-24 NOTE — ED NOTES
Access center contacted at 1906. [facility] hospatilist paged at 56.       Marion Gonzalez  01/1940

## 2020-01-24 NOTE — ED NOTES
Report given to Jason Hernandez from [facility] Oncology unit. Report method by phone   The following was reviewed with receiving RN:   Current vital signs:  BP (!) 131/32   Pulse 64   Temp 98.4 °F (36.9 °C) (Oral)   Resp 16   Ht 5' 2\" (1.575 m)   Wt 175 lb (79.4 kg)   SpO2 97%   BMI 32.01 kg/m²                MEWS Score: 1     Any medication or safety alerts were reviewed. Any pending diagnostics and notifications were also reviewed, as well as any safety concerns or issues, abnormal labs, abnormal imaging, and abnormal assessment findings. Questions were answered.             Vickie Silvestre RN  01/23/20 2024